# Patient Record
Sex: MALE | Race: WHITE | NOT HISPANIC OR LATINO | Employment: OTHER | URBAN - NONMETROPOLITAN AREA
[De-identification: names, ages, dates, MRNs, and addresses within clinical notes are randomized per-mention and may not be internally consistent; named-entity substitution may affect disease eponyms.]

---

## 2023-05-11 ENCOUNTER — OFFICE VISIT (OUTPATIENT)
Dept: PRIMARY CARE | Facility: CLINIC | Age: 83
End: 2023-05-11
Payer: MEDICARE

## 2023-05-11 VITALS
SYSTOLIC BLOOD PRESSURE: 120 MMHG | WEIGHT: 135.8 LBS | HEIGHT: 67 IN | HEART RATE: 100 BPM | DIASTOLIC BLOOD PRESSURE: 70 MMHG | BODY MASS INDEX: 21.31 KG/M2

## 2023-05-11 DIAGNOSIS — K70.30 ALCOHOLIC CIRRHOSIS OF LIVER WITHOUT ASCITES (MULTI): ICD-10-CM

## 2023-05-11 DIAGNOSIS — Z00.00 ROUTINE GENERAL MEDICAL EXAMINATION AT HEALTH CARE FACILITY: Primary | ICD-10-CM

## 2023-05-11 DIAGNOSIS — I10 PRIMARY HYPERTENSION: ICD-10-CM

## 2023-05-11 DIAGNOSIS — F02.B0 MODERATE LATE ONSET ALZHEIMER'S DEMENTIA WITHOUT BEHAVIORAL DISTURBANCE, PSYCHOTIC DISTURBANCE, MOOD DISTURBANCE, OR ANXIETY (MULTI): ICD-10-CM

## 2023-05-11 DIAGNOSIS — K57.32 DIVERTICULITIS OF LARGE INTESTINE WITHOUT PERFORATION OR ABSCESS WITHOUT BLEEDING: ICD-10-CM

## 2023-05-11 DIAGNOSIS — G30.1 MODERATE LATE ONSET ALZHEIMER'S DEMENTIA WITHOUT BEHAVIORAL DISTURBANCE, PSYCHOTIC DISTURBANCE, MOOD DISTURBANCE, OR ANXIETY (MULTI): ICD-10-CM

## 2023-05-11 PROCEDURE — 1159F MED LIST DOCD IN RCRD: CPT | Performed by: FAMILY MEDICINE

## 2023-05-11 PROCEDURE — 3074F SYST BP LT 130 MM HG: CPT | Performed by: FAMILY MEDICINE

## 2023-05-11 PROCEDURE — 99214 OFFICE O/P EST MOD 30 MIN: CPT | Performed by: FAMILY MEDICINE

## 2023-05-11 PROCEDURE — G0439 PPPS, SUBSEQ VISIT: HCPCS | Performed by: FAMILY MEDICINE

## 2023-05-11 PROCEDURE — 3078F DIAST BP <80 MM HG: CPT | Performed by: FAMILY MEDICINE

## 2023-05-11 PROCEDURE — 1160F RVW MEDS BY RX/DR IN RCRD: CPT | Performed by: FAMILY MEDICINE

## 2023-05-11 PROCEDURE — 1170F FXNL STATUS ASSESSED: CPT | Performed by: FAMILY MEDICINE

## 2023-05-11 PROCEDURE — 4004F PT TOBACCO SCREEN RCVD TLK: CPT | Performed by: FAMILY MEDICINE

## 2023-05-11 RX ORDER — DONEPEZIL HYDROCHLORIDE 10 MG/1
1 TABLET, FILM COATED ORAL DAILY
COMMUNITY
Start: 2020-10-14

## 2023-05-11 RX ORDER — LISINOPRIL 5 MG/1
1 TABLET ORAL DAILY
COMMUNITY
Start: 2020-08-26

## 2023-05-11 RX ORDER — CIPROFLOXACIN HYDROCHLORIDE 500 MG/1
TABLET, FILM COATED ORAL EVERY 12 HOURS
COMMUNITY
Start: 2023-05-06 | End: 2023-05-15

## 2023-05-11 RX ORDER — METRONIDAZOLE 500 MG/1
TABLET ORAL
COMMUNITY
Start: 2023-05-06 | End: 2023-05-15

## 2023-05-11 RX ORDER — FERROUS SULFATE 324(65)MG
65 TABLET, DELAYED RELEASE (ENTERIC COATED) ORAL DAILY
COMMUNITY
Start: 2023-04-11

## 2023-05-11 ASSESSMENT — ACTIVITIES OF DAILY LIVING (ADL)
MANAGING_FINANCES: NEEDS ASSISTANCE
BATHING: INDEPENDENT
DRESSING: INDEPENDENT
TAKING_MEDICATION: NEEDS ASSISTANCE
GROCERY_SHOPPING: NEEDS ASSISTANCE
DOING_HOUSEWORK: NEEDS ASSISTANCE

## 2023-05-11 ASSESSMENT — PATIENT HEALTH QUESTIONNAIRE - PHQ9
SUM OF ALL RESPONSES TO PHQ9 QUESTIONS 1 AND 2: 0
1. LITTLE INTEREST OR PLEASURE IN DOING THINGS: NOT AT ALL
2. FEELING DOWN, DEPRESSED OR HOPELESS: NOT AT ALL

## 2023-05-11 NOTE — PROGRESS NOTES
Subjective   Reason for Visit: Danny W Schoenian is an 82 y.o. male here for a Medicare Wellness visit.     Past Medical, Surgical, and Family History reviewed and updated in chart.    Reviewed all medications by prescribing practitioner or clinical pharmacist (such as prescriptions, OTCs, herbal therapies and supplements) and documented in the medical record.    HPI  Advance directives:. Advanced Care Planning discussed and documented advance care plan or surrogate decision maker documented in the medical record. Patient has living will. Patient has no healthcare POA.      Patient's End of Life Decisions: End of life decisions were reviewed with the patient. I agree to follow the patient's decisions. Concerns with the patient's end of life decisions: DNR.   HTN  Blood pressure good on lisinopril.   no cp no palpitations  history aided per wife       ED may 6  right lower quadrant    Comes and goes is currently on cipro and metronidazole      COPD  smoke 1 1/2 ppd x 60 years   does not want to quit         CT chest feb 2020    sob with rest and exertion has to stop every 25 yards   no changes    no hempotysis dtr does not want ct ldct     AD dx 2018   gradually getting worse and now not recognizing    dealing with forgetting    no behavioral issues   MOCA 10/14/2020 score 17/30    eagles almost daily with a beer for about a 1 hour         cont to loose wt    has loss 5 kg in the last 6 mnths   good days bad days    discussed adding remeron dtr initially did not want any medicactions    they have been trying ensure    recommend MVI daily     colonoscopy 2016 normal      Drinks alcohol 2/day  was 5 per day but states none since saturday  CT showed cirrhosis and recommended abstinence    Patient Care Team:  Wayne Landrum MD as PCP - General  Wayne Landrum MD as PCP - [facility] Attributed Provider  Wayne Landrum MD as PCP - [employer] [facility] PCP     Review of Systems    Objective   Vitals:  /70   Pulse 100   Ht  "1.702 m (5' 7\")   Wt 61.6 kg (135 lb 12.8 oz)   BMI 21.27 kg/m²       Physical Exam  Vitals reviewed.   Constitutional:       General: He is not in acute distress.     Appearance: Normal appearance.   HENT:      Head: Normocephalic and atraumatic.   Eyes:      Conjunctiva/sclera: Conjunctivae normal.   Cardiovascular:      Rate and Rhythm: Normal rate and regular rhythm.      Heart sounds: No murmur heard.  Pulmonary:      Effort: Pulmonary effort is normal.      Breath sounds: Normal breath sounds.   Abdominal:      General: Abdomen is flat. Bowel sounds are normal. There is no distension.      Palpations: Abdomen is soft. There is no mass.      Tenderness: There is abdominal tenderness (right lower quadrant). There is no guarding or rebound.   Lymphadenopathy:      Cervical: No cervical adenopathy.   Skin:     General: Skin is warm and dry.   Neurological:      General: No focal deficit present.      Mental Status: He is alert and oriented to person, place, and time.   Psychiatric:         Mood and Affect: Mood normal.         Judgment: Judgment normal.         Assessment/Plan   Problem List Items Addressed This Visit          Nervous    Moderate late onset Alzheimer's dementia without behavioral disturbance, psychotic disturbance, mood disturbance, or anxiety (CMS/HCC)     Other Visit Diagnoses       Routine general medical examination at health care facility    -  Primary    Alcoholic cirrhosis of liver without ascites (CMS/HCC)        Diverticulitis of large intestine without perforation or abscess without bleeding        Primary hypertension                   "

## 2023-05-25 ENCOUNTER — OFFICE VISIT (OUTPATIENT)
Dept: PRIMARY CARE | Facility: CLINIC | Age: 83
End: 2023-05-25
Payer: MEDICARE

## 2023-05-25 VITALS
WEIGHT: 136.4 LBS | SYSTOLIC BLOOD PRESSURE: 130 MMHG | DIASTOLIC BLOOD PRESSURE: 78 MMHG | HEIGHT: 67 IN | HEART RATE: 84 BPM | OXYGEN SATURATION: 99 % | BODY MASS INDEX: 21.41 KG/M2

## 2023-05-25 DIAGNOSIS — R10.31 RIGHT LOWER QUADRANT PAIN: ICD-10-CM

## 2023-05-25 DIAGNOSIS — K92.1 MELENA: Primary | ICD-10-CM

## 2023-05-25 PROCEDURE — 4004F PT TOBACCO SCREEN RCVD TLK: CPT | Performed by: FAMILY MEDICINE

## 2023-05-25 PROCEDURE — 1159F MED LIST DOCD IN RCRD: CPT | Performed by: FAMILY MEDICINE

## 2023-05-25 PROCEDURE — 99214 OFFICE O/P EST MOD 30 MIN: CPT | Performed by: FAMILY MEDICINE

## 2023-05-25 PROCEDURE — 1160F RVW MEDS BY RX/DR IN RCRD: CPT | Performed by: FAMILY MEDICINE

## 2023-05-25 ASSESSMENT — ENCOUNTER SYMPTOMS
VOMITING: 0
FEVER: 0

## 2023-05-25 NOTE — PROGRESS NOTES
"Subjective   Patient ID: Danny W Schoenian is a 82 y.o. male who presents for 1 week f/u (1 week f/u right side pain and diarrhea).    HPI   Right lateral abdomen   No bloating   No change in appettie    Last bm yesterday runny once black and at most 2 per day   Started iron 6 weeks ago no sticky     No change odor    No history of ulcer   States diarrhea 6 weeks     Has iron def anemia but stool guiac was nec in December   Now by exam is positive     Review of Systems   Constitutional:  Negative for fever.   Gastrointestinal:  Negative for vomiting.       Objective   /78   Pulse 84   Ht 1.702 m (5' 7\")   Wt 61.9 kg (136 lb 6.4 oz)   SpO2 99%   BMI 21.36 kg/m²     Physical Exam  Constitutional:       Appearance: Normal appearance.   HENT:      Head: Normocephalic and atraumatic.   Eyes:      Conjunctiva/sclera: Conjunctivae normal.      Pupils: Pupils are equal, round, and reactive to light.   Cardiovascular:      Rate and Rhythm: Normal rate and regular rhythm.      Heart sounds: Normal heart sounds.   Pulmonary:      Effort: Pulmonary effort is normal.      Breath sounds: Normal breath sounds.   Abdominal:      General: Abdomen is flat. There is no distension.      Palpations: There is mass (mass right abdomen and firm).      Tenderness: There is no guarding or rebound.      Hernia: No hernia is present.      Comments: Stool guiac pos    Lymphadenopathy:      Cervical: No cervical adenopathy.   Skin:     Coloration: Skin is not jaundiced.   Neurological:      General: No focal deficit present.      Mental Status: He is alert and oriented to person, place, and time.   Psychiatric:         Mood and Affect: Mood normal.         Behavior: Behavior normal.         Thought Content: Thought content normal.         Judgment: Judgment normal.         Assessment/Plan   Diagnoses and all orders for this visit:  Melena  -     CT abdomen pelvis w IV contrast; Future  -     Referral to General Surgery; Future  Right " lower quadrant pain  -     CT abdomen pelvis w IV contrast; Future  -     Referral to General Surgery; Future  Other orders  -     Follow Up In Primary Care

## 2023-06-19 ENCOUNTER — TELEPHONE (OUTPATIENT)
Dept: PRIMARY CARE | Facility: CLINIC | Age: 83
End: 2023-06-19
Payer: MEDICARE

## 2023-06-19 NOTE — TELEPHONE ENCOUNTER
Kamila from radiology asking if Dr. Landrum received and reviewed the results from imaging. Can call [phone].

## 2023-06-21 ENCOUNTER — OFFICE VISIT (OUTPATIENT)
Dept: PRIMARY CARE | Facility: CLINIC | Age: 83
End: 2023-06-21
Payer: MEDICARE

## 2023-06-21 VITALS
DIASTOLIC BLOOD PRESSURE: 50 MMHG | OXYGEN SATURATION: 98 % | HEIGHT: 67 IN | WEIGHT: 123 LBS | BODY MASS INDEX: 19.3 KG/M2 | SYSTOLIC BLOOD PRESSURE: 110 MMHG | HEART RATE: 107 BPM

## 2023-06-21 DIAGNOSIS — R10.31 RIGHT LOWER QUADRANT PAIN: Primary | ICD-10-CM

## 2023-06-21 DIAGNOSIS — K63.89 MASS OF COLON: ICD-10-CM

## 2023-06-21 PROCEDURE — 1159F MED LIST DOCD IN RCRD: CPT | Performed by: FAMILY MEDICINE

## 2023-06-21 PROCEDURE — 1160F RVW MEDS BY RX/DR IN RCRD: CPT | Performed by: FAMILY MEDICINE

## 2023-06-21 PROCEDURE — 99213 OFFICE O/P EST LOW 20 MIN: CPT | Performed by: FAMILY MEDICINE

## 2023-06-21 PROCEDURE — 4004F PT TOBACCO SCREEN RCVD TLK: CPT | Performed by: FAMILY MEDICINE

## 2023-06-21 RX ORDER — TRAMADOL HYDROCHLORIDE 50 MG/1
50 TABLET ORAL EVERY 6 HOURS PRN
Qty: 28 TABLET | Refills: 0 | Status: SHIPPED | OUTPATIENT
Start: 2023-06-21 | End: 2023-06-28

## 2023-06-21 NOTE — PROGRESS NOTES
"Subjective   Patient ID: Danny W Schoenian is a 82 y.o. male who presents for 6 month.    HPI   Wife present however both have memory loss  Discussed ct scan and looks like ascending colon cancer and adenomatoisis  Pt is rapidly losing weight due to lack of appetite  States colonscopy was about 5 to 6 years ago   He is willing to have a colonoscopy for dx  Having diffuse abd pain no N/V    Stated he may be too weak to go through OP testing   Discussed possible hospice if he continues to loose wt  Will start tramadol for comfort and see if that may  his appetite     Review of Systems    Objective   /50   Pulse 107   Ht 1.702 m (5' 7\")   Wt 55.8 kg (123 lb)   SpO2 98%   BMI 19.26 kg/m²     Physical Exam  Vitals reviewed.   Constitutional:       General: He is not in acute distress.     Appearance: Normal appearance.   HENT:      Head: Normocephalic and atraumatic.      Mouth/Throat:      Mouth: Mucous membranes are dry.   Eyes:      Conjunctiva/sclera: Conjunctivae normal.   Cardiovascular:      Heart sounds: No murmur heard.  Abdominal:      General: Abdomen is flat. Bowel sounds are normal.      Palpations: There is no mass.      Tenderness: There is abdominal tenderness. There is no guarding.   Lymphadenopathy:      Cervical: No cervical adenopathy.   Skin:     General: Skin is warm and dry.   Neurological:      Mental Status: He is alert.   Psychiatric:         Mood and Affect: Mood normal.         Judgment: Judgment normal.         Assessment/Plan   Diagnoses and all orders for this visit:  Right lower quadrant pain  -     Referral to General Surgery; Future  -     traMADol (Ultram) 50 mg tablet; Take 1 tablet (50 mg) by mouth every 6 hours if needed for severe pain (7 - 10) for up to 7 days.  Mass of colon  -     Referral to General Surgery; Future  -     traMADol (Ultram) 50 mg tablet; Take 1 tablet (50 mg) by mouth every 6 hours if needed for severe pain (7 - 10) for up to 7 days.         "

## 2023-07-03 ENCOUNTER — TELEPHONE (OUTPATIENT)
Dept: PRIMARY CARE | Facility: CLINIC | Age: 83
End: 2023-07-03
Payer: MEDICARE

## 2023-07-05 ENCOUNTER — PATIENT OUTREACH (OUTPATIENT)
Dept: CARE COORDINATION | Facility: CLINIC | Age: 83
End: 2023-07-05
Payer: MEDICARE

## 2023-07-05 RX ORDER — IBUPROFEN 200 MG
1 TABLET ORAL EVERY 24 HOURS
COMMUNITY

## 2023-07-05 RX ORDER — AMOXICILLIN AND CLAVULANATE POTASSIUM 875; 125 MG/1; MG/1
875 TABLET, FILM COATED ORAL 2 TIMES DAILY
COMMUNITY

## 2023-07-05 NOTE — TELEPHONE ENCOUNTER
What questions.  Since I have seen him it looks like gumaro did colonoscopy and he has been admitted and dc'ed

## 2023-07-05 NOTE — PROGRESS NOTES
Discharge Facility:[facility]  Discharge Diagnosis:R10.9 Abdominal Pain  Admission Date:6/29/2023  Discharge Date: 7/1/2023    PCP Appointment Date:7/19/2023, outside of 14 days  Specialist Appointment Date: Sadie 7/12/2023, To see Dr. Silva in 2 weeks, told to call and verify appointment  Hospital Encounter and Summary: Linked   See discharge assessment below for further details  Engagement  Call Start Time: 1418 (7/5/2023  2:41 PM)    Medications  Medications reviewed with patient/caregiver?: Yes (Augmentin 875/125 1 bid x 7, Nicotine Patch 14/24 hour) (7/5/2023  2:41 PM)  Is the patient having any side effects they believe may be caused by any medication additions or changes?: No (7/5/2023  2:41 PM)  Does the patient have all medications ordered at discharge?: No (7/5/2023  2:41 PM)  What is keeping the patient from filling the prescriptions?: Transportation (Had discussion that Pharmacy may be able to deliver to home. Her daughter is picking up meds she states.) (7/5/2023  2:41 PM)  Care Management Interventions: Provided patient education (as above) (7/5/2023  2:41 PM)  Is the patient taking all medications as directed (includes completed medication regime)?: No (due to not picked up yet, stressed importance of taking.) (7/5/2023  2:41 PM)    Appointments  Does the patient have a primary care provider?: Yes (7/5/2023  2:41 PM)  Care Management Interventions: Verified appointment date/time/provider (Has appointment for 7/19/2023, offered sooner declined due to daughter and transportation.) (7/5/2023  2:41 PM)  Care Management Interventions: Advised to reschedule appointment (7/5/2023  2:41 PM)    Patient Teaching  Does the patient have access to their discharge instructions?: Yes (7/5/2023  2:41 PM)  Care Management Interventions: Reviewed instructions with patient (7/5/2023  2:41 PM)  What is the patient's perception of their health status since discharge?: Same (Brandon states still having abdominal discomfort)  (7/5/2023  2:41 PM)  Is the patient/caregiver able to teach back the hierarchy of who to call/visit for symptoms/problems? PCP, Specialist, Home Health nurse, Urgent Care, ED, 911: Yes (7/5/2023  2:41 PM)  If the patient is a current smoker, are they able to teach back resources for cessation?: Smoking cessation medications (Discussed with Terra, she states is losing sanchez, will not quit despite all her efforts.) (7/5/2023  2:41 PM)    Wrap Up  Wrap Up Additional Comments: -- (Instructed to contact Dr. Silva office to verify appointment date/time. We discussed Palliative care and how may be beneficial for future needs. She is having daughter manage.) (7/5/2023  2:41 PM)

## 2023-07-19 ENCOUNTER — APPOINTMENT (OUTPATIENT)
Dept: PRIMARY CARE | Facility: CLINIC | Age: 83
End: 2023-07-19
Payer: MEDICARE

## 2023-07-20 ENCOUNTER — HOSPITAL ENCOUNTER (OUTPATIENT)
Dept: DATA CONVERSION | Facility: HOSPITAL | Age: 83
End: 2023-07-20
Attending: INTERNAL MEDICINE | Admitting: INTERNAL MEDICINE
Payer: MEDICARE

## 2023-07-20 DIAGNOSIS — R10.9 UNSPECIFIED ABDOMINAL PAIN: ICD-10-CM

## 2023-07-20 DIAGNOSIS — R18.8 OTHER ASCITES: ICD-10-CM

## 2023-09-30 NOTE — H&P
History of Present Illness:   HPI:    SCHOENIAN, DANNY W is a 82 year old Male    He presented here today due to possible need for a paracentesis/drain placement  He was recently hospitalized and also recently was diagnosed with a colonic mass and had ascites for which I drained his ascites at his recent hospitalization    He has a past medical history of COPD cirrhosis hypertension iron deficiency anemia aspiration pneumonia and a colon mass    His past surgical history includes cataract removal and leg surgery    Family history is negative for colon cancer    Social history includes he lives at home independently with his wife    He states that he is having abdominal pain he states that it is mostly down in the right lower quadrant  A full 10 point review of systems was obtained and is negative except for the HPI as above           Allergies:  ·  No Known Allergies :     Medications Prior to Admission:   Admission Medication Reconciliation has not been completed for this patient.    Objective:     Objective Information:        Pain reported at 7/20 7:45: 0 = None    Abdominal exam performed  I also used the ultrasound  His abdomen is soft there is some positive ascites felt even on exam mostly in the bilateral gutters  Ultrasound was performed of the abdomen and he does have ascites however the ascites is pretty small  It is mostly concentrated on his bilateral flanks and the gutters  There is really not a lot of ascites his abdomen is quite soft    Recent Lab Results:    Results:    CBC: 7/15/2023 04:47              \     Hgb     /                              \     10.7 L    /  WBC  ----------------  Plt               11.4 H    ----------------    114 L            /     Hct     \                              /     35.3 L    \            RBC: 3.74 L    MCV: 94     Neutrophil %: 68.3      BMP: 7/14/2023 05:23  NA+        Cl-     BUN  /                         140    113 H   24 H   /  --------------------------------  Glucose                ---------------------------  96    K+     HCO3-   Creat \                         4.9  24    1.63 H \  Calcium : 7.9 L    Anion Gap : 8 L      CMP: 7/15/2023 04:47  NA+        Cl-     BUN  /                         138    110 H   24 H  /  --------------------------------  Glucose                ---------------------------  105 H    K+     HCO3-   Creat \                         4.1    23    1.56 H \           \  T Bili  /                    \  0.6  /  AST  x ---- x ALT        29 x ---- x 26         /  Alk P   \               /  174 H \  Calcium : 7.9 L    Anion Gap : 9 L     Albumin : 2.4 L    T Protein : 5.3 L           Assessment and Plan:   Assessment:    Recurrent ascites  Colonic mass  Abdominal pain    Procedure:  Abdominal ultrasound  Consent: He gave me verbal consent    Procedure:  On exam his abdomen does not feel taut at all and there is very soft with positive ascites felt on the flanks  The ultrasound performed confirmed my suspicions  The ultrasound was used to visualize the right and left upper and lower quadrants  The ascites is concentrated on the bilateral flanks in the dependent areas and is quite small in nature  I discussed this with him his wife and his daughter  At this time no need for any intervention  Would continue his pain management per palliative care as an outpatient    I did discuss with his wife and daughter that if his abdomen starts to get firm and hard with increasing girth then we could possibly place a drain for comfort measures      Electronic Signatures:  Lokesh Higuera ()  (Signed 20-Jul-2023 10:29)   Authored: History of Present Illness, Comorbidities,  Allergies, Medications Prior to Admission, Objective, Assessment and Plan, Note Completion      Last Updated: 20-Jul-2023 10:29 by Lokesh Higuera ()